# Patient Record
Sex: MALE | Race: WHITE | URBAN - METROPOLITAN AREA
[De-identification: names, ages, dates, MRNs, and addresses within clinical notes are randomized per-mention and may not be internally consistent; named-entity substitution may affect disease eponyms.]

---

## 2018-11-07 ENCOUNTER — APPOINTMENT (RX ONLY)
Dept: URBAN - METROPOLITAN AREA CLINIC 325 | Facility: CLINIC | Age: 68
Setting detail: DERMATOLOGY
End: 2018-11-07

## 2019-06-05 ENCOUNTER — APPOINTMENT (RX ONLY)
Dept: URBAN - METROPOLITAN AREA CLINIC 325 | Facility: CLINIC | Age: 69
Setting detail: DERMATOLOGY
End: 2019-06-05

## 2019-06-05 DIAGNOSIS — D22 MELANOCYTIC NEVI: ICD-10-CM

## 2019-06-05 DIAGNOSIS — L82.1 OTHER SEBORRHEIC KERATOSIS: ICD-10-CM

## 2019-06-05 DIAGNOSIS — L57.0 ACTINIC KERATOSIS: ICD-10-CM

## 2019-06-05 DIAGNOSIS — D69.2 OTHER NONTHROMBOCYTOPENIC PURPURA: ICD-10-CM

## 2019-06-05 DIAGNOSIS — L81.4 OTHER MELANIN HYPERPIGMENTATION: ICD-10-CM

## 2019-06-05 DIAGNOSIS — L57.8 OTHER SKIN CHANGES DUE TO CHRONIC EXPOSURE TO NONIONIZING RADIATION: ICD-10-CM

## 2019-06-05 PROBLEM — Z85.6 PERSONAL HISTORY OF LEUKEMIA: Status: ACTIVE | Noted: 2019-06-05

## 2019-06-05 PROBLEM — J30.1 ALLERGIC RHINITIS DUE TO POLLEN: Status: ACTIVE | Noted: 2019-06-05

## 2019-06-05 PROBLEM — Z85.79 PERSONAL HISTORY OF OTHER MALIGNANT NEOPLASMS OF LYMPHOID, HEMATOPOIETIC AND RELATED TISSUES: Status: ACTIVE | Noted: 2019-06-05

## 2019-06-05 PROBLEM — L55.1 SUNBURN OF SECOND DEGREE: Status: ACTIVE | Noted: 2019-06-05

## 2019-06-05 PROBLEM — N40.0 BENIGN PROSTATIC HYPERPLASIA WITHOUT LOWER URINARY TRACT SYMPTOMS: Status: ACTIVE | Noted: 2019-06-05

## 2019-06-05 PROBLEM — D22.9 MELANOCYTIC NEVI, UNSPECIFIED: Status: ACTIVE | Noted: 2019-06-05

## 2019-06-05 PROCEDURE — 17003 DESTRUCT PREMALG LES 2-14: CPT

## 2019-06-05 PROCEDURE — ? COUNSELING

## 2019-06-05 PROCEDURE — 99202 OFFICE O/P NEW SF 15 MIN: CPT | Mod: 25

## 2019-06-05 PROCEDURE — ? LIQUID NITROGEN

## 2019-06-05 PROCEDURE — 17000 DESTRUCT PREMALG LESION: CPT

## 2019-06-05 ASSESSMENT — LOCATION DETAILED DESCRIPTION DERM
LOCATION DETAILED: LEFT ANTERIOR PROXIMAL UPPER ARM
LOCATION DETAILED: RIGHT ANTERIOR SHOULDER
LOCATION DETAILED: LEFT SUPERIOR FOREHEAD
LOCATION DETAILED: LEFT SUPERIOR PARIETAL SCALP
LOCATION DETAILED: RIGHT SUPERIOR MEDIAL FOREHEAD
LOCATION DETAILED: RIGHT SUPERIOR FOREHEAD

## 2019-06-05 ASSESSMENT — LOCATION SIMPLE DESCRIPTION DERM
LOCATION SIMPLE: LEFT FOREHEAD
LOCATION SIMPLE: RIGHT SHOULDER
LOCATION SIMPLE: LEFT UPPER ARM
LOCATION SIMPLE: SCALP
LOCATION SIMPLE: RIGHT FOREHEAD

## 2019-06-05 ASSESSMENT — LOCATION ZONE DERM
LOCATION ZONE: ARM
LOCATION ZONE: SCALP
LOCATION ZONE: FACE

## 2020-01-29 ENCOUNTER — APPOINTMENT (RX ONLY)
Dept: URBAN - METROPOLITAN AREA CLINIC 325 | Facility: CLINIC | Age: 70
Setting detail: DERMATOLOGY
End: 2020-01-29

## 2020-01-29 DIAGNOSIS — L57.0 ACTINIC KERATOSIS: ICD-10-CM

## 2020-01-29 DIAGNOSIS — L57.8 OTHER SKIN CHANGES DUE TO CHRONIC EXPOSURE TO NONIONIZING RADIATION: ICD-10-CM

## 2020-01-29 DIAGNOSIS — D69.2 OTHER NONTHROMBOCYTOPENIC PURPURA: ICD-10-CM

## 2020-01-29 DIAGNOSIS — D22 MELANOCYTIC NEVI: ICD-10-CM

## 2020-01-29 DIAGNOSIS — L82.1 OTHER SEBORRHEIC KERATOSIS: ICD-10-CM

## 2020-01-29 DIAGNOSIS — L81.4 OTHER MELANIN HYPERPIGMENTATION: ICD-10-CM

## 2020-01-29 DIAGNOSIS — D485 NEOPLASM OF UNCERTAIN BEHAVIOR OF SKIN: ICD-10-CM

## 2020-01-29 PROBLEM — D22.9 MELANOCYTIC NEVI, UNSPECIFIED: Status: ACTIVE | Noted: 2020-01-29

## 2020-01-29 PROBLEM — D48.5 NEOPLASM OF UNCERTAIN BEHAVIOR OF SKIN: Status: ACTIVE | Noted: 2020-01-29

## 2020-01-29 PROCEDURE — ? COUNSELING

## 2020-01-29 PROCEDURE — ? FULL BODY SKIN EXAM

## 2020-01-29 PROCEDURE — 11103 TANGNTL BX SKIN EA SEP/ADDL: CPT

## 2020-01-29 PROCEDURE — 99213 OFFICE O/P EST LOW 20 MIN: CPT | Mod: 25

## 2020-01-29 PROCEDURE — 11102 TANGNTL BX SKIN SINGLE LES: CPT

## 2020-01-29 PROCEDURE — ? BIOPSY BY SHAVE METHOD

## 2020-01-29 PROCEDURE — ? DEFER

## 2020-01-29 ASSESSMENT — LOCATION SIMPLE DESCRIPTION DERM
LOCATION SIMPLE: RIGHT POPLITEAL SKIN
LOCATION SIMPLE: LEFT FOREARM
LOCATION SIMPLE: SCALP
LOCATION SIMPLE: LEFT ZYGOMA
LOCATION SIMPLE: LEFT FOREHEAD
LOCATION SIMPLE: RIGHT TEMPLE
LOCATION SIMPLE: RIGHT HAND
LOCATION SIMPLE: SUPERIOR FOREHEAD
LOCATION SIMPLE: LEFT HAND
LOCATION SIMPLE: LEFT PRETIBIAL REGION
LOCATION SIMPLE: RIGHT FOREARM
LOCATION SIMPLE: CHEST
LOCATION SIMPLE: LEFT ANKLE

## 2020-01-29 ASSESSMENT — LOCATION ZONE DERM
LOCATION ZONE: TRUNK
LOCATION ZONE: FACE
LOCATION ZONE: ARM
LOCATION ZONE: SCALP
LOCATION ZONE: LEG
LOCATION ZONE: HAND

## 2020-01-29 ASSESSMENT — LOCATION DETAILED DESCRIPTION DERM
LOCATION DETAILED: RIGHT POPLITEAL SKIN
LOCATION DETAILED: RIGHT RADIAL DORSAL HAND
LOCATION DETAILED: RIGHT CENTRAL TEMPLE
LOCATION DETAILED: LEFT RADIAL DORSAL HAND
LOCATION DETAILED: SUPERIOR MID FOREHEAD
LOCATION DETAILED: LEFT CENTRAL PARIETAL SCALP
LOCATION DETAILED: LEFT DISTAL DORSAL FOREARM
LOCATION DETAILED: LEFT MEDIAL ZYGOMA
LOCATION DETAILED: LEFT POSTERIOR ANKLE
LOCATION DETAILED: LEFT LATERAL SUPERIOR CHEST
LOCATION DETAILED: LEFT SUPERIOR MEDIAL FOREHEAD
LOCATION DETAILED: RIGHT PROXIMAL DORSAL FOREARM
LOCATION DETAILED: LEFT PROXIMAL PRETIBIAL REGION

## 2020-01-29 NOTE — PROCEDURE: MIPS QUALITY
Quality 138: Melanoma: Coordination Of Care: The patient does not have a PCP or referring physician.
Quality 47: Advance Care Plan: Advance care planning not documented, reason not otherwise specified.
Quality 337: Tuberculosis Prevention For Psoriasis And Psoriatic Arthritis Patients On A Biological Immune Response Modifier: No documentation of negative or managed positive TB screen
Quality 431: Preventive Care And Screening: Unhealthy Alcohol Use - Screening: Unhealthy alcohol use screening not performed, reason not otherwise specified
Quality 402: Tobacco Use And Help With Quitting Among Adolescents: Patient screened for tobacco and is an ex-smoker
Quality 130: Documentation Of Current Medications In The Medical Record: Current Medications Documented
Detail Level: Detailed
Quality 410: Psoriasis Clinical Response To Oral Systemic Or Biologic Mediations: Psoriasis Assessment Tool NOT Documented
Quality 226: Preventive Care And Screening: Tobacco Use: Screening And Cessation Intervention: Patient screened for tobacco use and is an ex/non-smoker
Quality 137: Melanoma: Continuity Of Care - Recall System: Recall system not utilized, reason not otherwise specified

## 2020-01-29 NOTE — PROCEDURE: BIOPSY BY SHAVE METHOD
Type Of Destruction Used: Curettage
Consent: Written consent was obtained and risks were reviewed including but not limited to scarring, infection, bleeding, scabbing, incomplete removal, nerve damage and allergy to anesthesia.
Electrodesiccation And Curettage Text: The wound bed was treated with electrodesiccation and curettage after the biopsy was performed.
Hide Biopsy Depth?: No
Biopsy Type: H and E
Electrodesiccation Text: The wound bed was treated with electrodesiccation after the biopsy was performed.
Notification Instructions: Patient advised the office will call only if further action is needed
Silver Nitrate Text: The wound bed was treated with silver nitrate after the biopsy was performed.
Detail Level: Detailed
Biopsy Method: double edge Personna blade
Dressing: bandage
Was A Bandage Applied: Yes
Anesthesia Volume In Cc (Will Not Render If 0): 0.5
Size Of Lesion In Cm: 1
Billing Type: Third-Party Bill
Lab: -[id number]
Curettage Text: The wound bed was treated with curettage after the biopsy was performed.
Hemostasis: Electrocautery
Wound Care: Aquaphor
Lab Facility: 3
X Size Of Lesion In Cm: 0
Anesthesia Type: 1% lidocaine with 1:100,000 epinephrine and 408mcg clindamycin/ml and a 1:10 solution of 8.4% sodium bicarbonate
Post-Care Instructions: I reviewed with the patient in detail post-care instructions. Patient is to keep the biopsy site dry overnight, and then apply petrolatum or aquaphor  daily until healed. May leave area opened without bandage unless rubbing against clothing. Patient to avoid Neosporin, Polyrporin, Bacitracin and Hydrogen Peroxide.
Cryotherapy Text: The wound bed was treated with cryotherapy after the biopsy was performed.
Depth Of Biopsy: dermis
Size Of Lesion In Cm: 1.5

## 2020-02-12 ENCOUNTER — APPOINTMENT (RX ONLY)
Dept: URBAN - METROPOLITAN AREA CLINIC 325 | Facility: CLINIC | Age: 70
Setting detail: DERMATOLOGY
End: 2020-02-12

## 2020-02-12 DIAGNOSIS — L57.0 ACTINIC KERATOSIS: ICD-10-CM

## 2020-02-12 DIAGNOSIS — D69.2 OTHER NONTHROMBOCYTOPENIC PURPURA: ICD-10-CM

## 2020-02-12 PROBLEM — C44.722 SQUAMOUS CELL CARCINOMA OF SKIN OF RIGHT LOWER LIMB, INCLUDING HIP: Status: ACTIVE | Noted: 2020-02-12

## 2020-02-12 PROBLEM — C44.719 BASAL CELL CARCINOMA OF SKIN OF LEFT LOWER LIMB, INCLUDING HIP: Status: ACTIVE | Noted: 2020-02-12

## 2020-02-12 PROCEDURE — 17000 DESTRUCT PREMALG LESION: CPT

## 2020-02-12 PROCEDURE — ? CONSULTATION FOR MOHS SURGERY

## 2020-02-12 PROCEDURE — ? LIQUID NITROGEN

## 2020-02-12 PROCEDURE — ? COUNSELING

## 2020-02-12 PROCEDURE — ? ADDITIONAL NOTES

## 2020-02-12 PROCEDURE — 99213 OFFICE O/P EST LOW 20 MIN: CPT | Mod: 25

## 2020-02-12 PROCEDURE — 17003 DESTRUCT PREMALG LES 2-14: CPT

## 2020-02-12 ASSESSMENT — LOCATION DETAILED DESCRIPTION DERM
LOCATION DETAILED: LEFT LATERAL CANTHUS
LOCATION DETAILED: LEFT CENTRAL PARIETAL SCALP
LOCATION DETAILED: RIGHT DISTAL CALF
LOCATION DETAILED: LEFT LATERAL INFERIOR CHEST
LOCATION DETAILED: LEFT POSTERIOR ANKLE
LOCATION DETAILED: LEFT SUPERIOR MEDIAL FOREHEAD
LOCATION DETAILED: LEFT DISTAL PRETIBIAL REGION
LOCATION DETAILED: LEFT MEDIAL SUPERIOR CHEST
LOCATION DETAILED: RIGHT DISTAL DORSAL FOREARM

## 2020-02-12 ASSESSMENT — LOCATION SIMPLE DESCRIPTION DERM
LOCATION SIMPLE: LEFT LATERAL CANTHUS
LOCATION SIMPLE: LEFT FOREHEAD
LOCATION SIMPLE: SCALP
LOCATION SIMPLE: LEFT PRETIBIAL REGION
LOCATION SIMPLE: RIGHT FOREARM
LOCATION SIMPLE: RIGHT CALF
LOCATION SIMPLE: LEFT ANKLE
LOCATION SIMPLE: CHEST

## 2020-02-12 ASSESSMENT — LOCATION ZONE DERM
LOCATION ZONE: FACE
LOCATION ZONE: ARM
LOCATION ZONE: TRUNK
LOCATION ZONE: SCALP
LOCATION ZONE: EYELID
LOCATION ZONE: LEG

## 2020-02-12 NOTE — PROCEDURE: MIPS QUALITY
Quality 47: Advance Care Plan: Advance care planning not documented, reason not otherwise specified.
Quality 138: Melanoma: Coordination Of Care: The patient does not have a PCP or referring physician.
Quality 431: Preventive Care And Screening: Unhealthy Alcohol Use - Screening: Unhealthy alcohol use screening not performed, reason not otherwise specified
Quality 337: Tuberculosis Prevention For Psoriasis And Psoriatic Arthritis Patients On A Biological Immune Response Modifier: No documentation of negative or managed positive TB screen
Quality 402: Tobacco Use And Help With Quitting Among Adolescents: Patient screened for tobacco and is an ex-smoker
Quality 130: Documentation Of Current Medications In The Medical Record: Current Medications Documented
Quality 137: Melanoma: Continuity Of Care - Recall System: Recall system not utilized, reason not otherwise specified
Quality 410: Psoriasis Clinical Response To Oral Systemic Or Biologic Mediations: Psoriasis Assessment Tool NOT Documented
Detail Level: Detailed
Quality 226: Preventive Care And Screening: Tobacco Use: Screening And Cessation Intervention: Patient screened for tobacco use and is an ex/non-smoker

## 2020-02-12 NOTE — PROCEDURE: ADDITIONAL NOTES
Detail Level: Simple
Additional Notes: 2ND [facility].
Additional Notes: 1ST MOHS SITE

## 2020-03-11 ENCOUNTER — APPOINTMENT (RX ONLY)
Dept: URBAN - METROPOLITAN AREA CLINIC 325 | Facility: CLINIC | Age: 70
Setting detail: DERMATOLOGY
End: 2020-03-11

## 2020-03-11 PROCEDURE — ? MOHS SURGERY

## 2020-04-27 ENCOUNTER — APPOINTMENT (RX ONLY)
Dept: URBAN - METROPOLITAN AREA CLINIC 325 | Facility: CLINIC | Age: 70
Setting detail: DERMATOLOGY
End: 2020-04-27

## 2020-04-27 PROBLEM — C44.722 SQUAMOUS CELL CARCINOMA OF SKIN OF RIGHT LOWER LIMB, INCLUDING HIP: Status: ACTIVE | Noted: 2020-04-27

## 2020-04-27 PROCEDURE — ? MOHS SURGERY

## 2020-04-27 PROCEDURE — 13121 CMPLX RPR S/A/L 2.6-7.5 CM: CPT

## 2020-04-27 PROCEDURE — 17313 MOHS 1 STAGE T/A/L: CPT

## 2020-05-11 ENCOUNTER — APPOINTMENT (RX ONLY)
Dept: URBAN - METROPOLITAN AREA CLINIC 325 | Facility: CLINIC | Age: 70
Setting detail: DERMATOLOGY
End: 2020-05-11

## 2020-05-11 DIAGNOSIS — Z48.02 ENCOUNTER FOR REMOVAL OF SUTURES: ICD-10-CM

## 2020-05-11 DIAGNOSIS — L82.1 OTHER SEBORRHEIC KERATOSIS: ICD-10-CM

## 2020-05-11 DIAGNOSIS — L57.0 ACTINIC KERATOSIS: ICD-10-CM

## 2020-05-11 DIAGNOSIS — D485 NEOPLASM OF UNCERTAIN BEHAVIOR OF SKIN: ICD-10-CM

## 2020-05-11 PROBLEM — D48.5 NEOPLASM OF UNCERTAIN BEHAVIOR OF SKIN: Status: ACTIVE | Noted: 2020-05-11

## 2020-05-11 PROCEDURE — ? SUTURE REMOVAL (NO GLOBAL PERIOD)

## 2020-05-11 PROCEDURE — ? COUNSELING

## 2020-05-11 PROCEDURE — ? FULL BODY SKIN EXAM - DECLINED

## 2020-05-11 PROCEDURE — 99213 OFFICE O/P EST LOW 20 MIN: CPT

## 2020-05-11 PROCEDURE — ? DEFER

## 2020-05-11 ASSESSMENT — LOCATION ZONE DERM
LOCATION ZONE: SCALP
LOCATION ZONE: LEG
LOCATION ZONE: FACE
LOCATION ZONE: EAR

## 2020-05-11 ASSESSMENT — LOCATION DETAILED DESCRIPTION DERM
LOCATION DETAILED: RIGHT POPLITEAL SKIN
LOCATION DETAILED: RIGHT CENTRAL MALAR CHEEK
LOCATION DETAILED: LEFT SUPERIOR CRUS OF ANTIHELIX
LOCATION DETAILED: RIGHT SUPERIOR PARIETAL SCALP
LOCATION DETAILED: LEFT DISTAL POSTERIOR THIGH
LOCATION DETAILED: LEFT CENTRAL PARIETAL SCALP

## 2020-05-11 ASSESSMENT — LOCATION SIMPLE DESCRIPTION DERM
LOCATION SIMPLE: RIGHT POPLITEAL SKIN
LOCATION SIMPLE: LEFT EAR
LOCATION SIMPLE: LEFT POSTERIOR THIGH
LOCATION SIMPLE: RIGHT CHEEK
LOCATION SIMPLE: SCALP

## 2020-05-11 NOTE — PROCEDURE: MIPS QUALITY
Quality 47: Advance Care Plan: Advance Care Planning discussed and documented; advance care plan or surrogate decision maker documented in the medical record.
Quality 138: Melanoma: Coordination Of Care: Treatment plan not communicated, reason not otherwise specified.
Quality 226: Preventive Care And Screening: Tobacco Use: Screening And Cessation Intervention: Tobacco Screening not Performed for Medical Reasons
Quality 337: Tuberculosis Prevention For Psoriasis And Psoriatic Arthritis Patients On A Biological Immune Response Modifier: No documentation of negative or managed positive TB screen
Quality 130: Documentation Of Current Medications In The Medical Record: Current Medications Documented
Detail Level: Detailed
Quality 137: Melanoma: Continuity Of Care - Recall System: Recall system not utilized, reason not otherwise specified
Quality 402: Tobacco Use And Help With Quitting Among Adolescents: Tobacco Screening OR Tobacco Cessation Intervention not Performed Reason Not Otherwise Specified
Quality 410: Psoriasis Clinical Response To Oral Systemic Or Biologic Mediations: Psoriasis Assessment Tool NOT Documented
Quality 431: Preventive Care And Screening: Unhealthy Alcohol Use - Screening: Unhealthy alcohol use screening not performed, reason not otherwise specified

## 2020-05-11 NOTE — PROCEDURE: DEFER
Detail Level: Detailed
Introduction Text (Please End With A Colon): The following procedure was deferred:
Procedure To Be Performed At Next Visit: Cryotherapy
Instructions (Optional): Scalp and left ear
Procedure To Be Performed At Next Visit: Biopsy by shave method
Instructions (Optional): Scalp 0.6cm, rule out BCC VS SCC

## 2020-05-11 NOTE — PROCEDURE: SUTURE REMOVAL (NO GLOBAL PERIOD)
Detail Level: Detailed
Abdomen soft, nontender, nondistended, bowel sounds present in all 4 quadrants.

## 2020-06-02 ENCOUNTER — APPOINTMENT (RX ONLY)
Dept: URBAN - METROPOLITAN AREA CLINIC 325 | Facility: CLINIC | Age: 70
Setting detail: DERMATOLOGY
End: 2020-06-02

## 2020-06-02 PROBLEM — C44.719 BASAL CELL CARCINOMA OF SKIN OF LEFT LOWER LIMB, INCLUDING HIP: Status: ACTIVE | Noted: 2020-06-02

## 2020-06-02 PROCEDURE — ? FULL BODY SKIN EXAM - DECLINED

## 2020-06-02 PROCEDURE — 13121 CMPLX RPR S/A/L 2.6-7.5 CM: CPT

## 2020-06-02 PROCEDURE — 17313 MOHS 1 STAGE T/A/L: CPT

## 2020-06-02 PROCEDURE — ? MOHS SURGERY

## 2020-06-02 NOTE — PROCEDURE: MOHS SURGERY
How Severe Are Your Spot(S)?: mild Hpi Title: Evaluation of Skin Lesions Family Member: Mother Location Indication Override (Is Already Calculated Based On Selected Body Location): Area L

## 2020-06-02 NOTE — PROCEDURE: MOHS SURGERY
waiting Closure 3 Information: This tab is for additional flaps and grafts above and beyond our usual structured repairs.  Please note if you enter information here it will not currently bill and you will need to add the billing information manually.

## 2020-06-16 ENCOUNTER — APPOINTMENT (RX ONLY)
Dept: URBAN - METROPOLITAN AREA CLINIC 325 | Facility: CLINIC | Age: 70
Setting detail: DERMATOLOGY
End: 2020-06-16

## 2020-06-16 DIAGNOSIS — Z48.02 ENCOUNTER FOR REMOVAL OF SUTURES: ICD-10-CM

## 2020-06-16 DIAGNOSIS — L57.0 ACTINIC KERATOSIS: ICD-10-CM

## 2020-06-16 DIAGNOSIS — D485 NEOPLASM OF UNCERTAIN BEHAVIOR OF SKIN: ICD-10-CM

## 2020-06-16 PROBLEM — D48.5 NEOPLASM OF UNCERTAIN BEHAVIOR OF SKIN: Status: ACTIVE | Noted: 2020-06-16

## 2020-06-16 PROCEDURE — ? LIQUID NITROGEN

## 2020-06-16 PROCEDURE — 17003 DESTRUCT PREMALG LES 2-14: CPT

## 2020-06-16 PROCEDURE — ? SUTURE REMOVAL (NO GLOBAL PERIOD)

## 2020-06-16 PROCEDURE — ? FULL BODY SKIN EXAM - DECLINED

## 2020-06-16 PROCEDURE — ? BIOPSY BY SHAVE METHOD

## 2020-06-16 PROCEDURE — 11102 TANGNTL BX SKIN SINGLE LES: CPT

## 2020-06-16 PROCEDURE — ? ADDITIONAL NOTES

## 2020-06-16 PROCEDURE — 17000 DESTRUCT PREMALG LESION: CPT | Mod: 59

## 2020-06-16 ASSESSMENT — LOCATION DETAILED DESCRIPTION DERM
LOCATION DETAILED: LEFT DISTAL PRETIBIAL REGION
LOCATION DETAILED: LEFT ANTIHELIX
LOCATION DETAILED: LEFT INFERIOR TEMPLE
LOCATION DETAILED: LEFT SUPERIOR PARIETAL SCALP

## 2020-06-16 ASSESSMENT — LOCATION SIMPLE DESCRIPTION DERM
LOCATION SIMPLE: LEFT EAR
LOCATION SIMPLE: SCALP
LOCATION SIMPLE: LEFT TEMPLE
LOCATION SIMPLE: LEFT PRETIBIAL REGION

## 2020-06-16 ASSESSMENT — LOCATION ZONE DERM
LOCATION ZONE: LEG
LOCATION ZONE: SCALP
LOCATION ZONE: EAR
LOCATION ZONE: FACE

## 2020-06-16 ASSESSMENT — PAIN INTENSITY VAS: HOW INTENSE IS YOUR PAIN 0 BEING NO PAIN, 10 BEING THE MOST SEVERE PAIN POSSIBLE?: NO PAIN

## 2020-06-16 NOTE — PROCEDURE: BIOPSY BY SHAVE METHOD
Detail Level: Detailed
Depth Of Biopsy: dermis
Was A Bandage Applied: Yes
Size Of Lesion In Cm: 0.6
X Size Of Lesion In Cm: 0
Biopsy Type: H and E
Biopsy Method: double edge Personna blade
Anesthesia Type: 1% lidocaine with 1:100,000 epinephrine and 408mcg clindamycin/ml and a 1:10 solution of 8.4% sodium bicarbonate
Anesthesia Volume In Cc (Will Not Render If 0): 0.5
Hemostasis: Electrocautery
Wound Care: Aquaphor
Dressing: bandage
Destruction After The Procedure: No
Type Of Destruction Used: Curettage
Curettage Text: The wound bed was treated with curettage after the biopsy was performed.
Cryotherapy Text: The wound bed was treated with cryotherapy after the biopsy was performed.
Electrodesiccation Text: The wound bed was treated with electrodesiccation after the biopsy was performed.
Electrodesiccation And Curettage Text: The wound bed was treated with electrodesiccation and curettage after the biopsy was performed.
Silver Nitrate Text: The wound bed was treated with silver nitrate after the biopsy was performed.
Lab: -60
Lab Facility: [id number]
Consent: Written consent was obtained and risks were reviewed including but not limited to scarring, infection, bleeding, scabbing, incomplete removal, nerve damage and allergy to anesthesia.
Post-Care Instructions: I reviewed with the patient in detail post-care instructions. Patient is to keep the biopsy site dry overnight, and then apply petrolatum or aquaphor  daily until healed. May leave area opened without bandage unless rubbing against clothing. Patient to avoid Neosporin, Polyrporin, Bacitracin and Hydrogen Peroxide.
Notification Instructions: Patient advised the office will call only if further action is needed
Billing Type: Third-Party Bill
Information: Selecting Yes will display possible errors in your note based on the variables you have selected. This validation is only offered as a suggestion for you. PLEASE NOTE THAT THE VALIDATION TEXT WILL BE REMOVED WHEN YOU FINALIZE YOUR NOTE. IF YOU WANT TO FAX A PRELIMINARY NOTE YOU WILL NEED TO TOGGLE THIS TO 'NO' IF YOU DO NOT WANT IT IN YOUR FAXED NOTE.

## 2020-06-16 NOTE — PROCEDURE: MIPS QUALITY
Quality 205 Hiv/Aids: Sexually Transmitted Disease Screening For Chlamydia, Gonorrhea, And Syphilis: Patient refused screening.
Quality 337: Tuberculosis Prevention For Psoriasis And Psoriatic Arthritis Patients On A Biological Immune Response Modifier: No documentation of negative or managed positive TB screen
Quality 226: Preventive Care And Screening: Tobacco Use: Screening And Cessation Intervention: Tobacco Screening not Performed for Unknown Reasons
Quality 127: Diabetic Foot And Ankle Care, Ulcer Prevention - Evaluation Of Footwear: Footwear Evaluation not Performed
Quality 143: Oncology: Medical And Radiation- Pain Intensity Quantified: Pain severity not assessed.
Detail Level: Detailed
Quality 130: Documentation Of Current Medications In The Medical Record: Current Medications Documented
Quality 265: Biopsy Follow-Up: Biopsy Results not Reviewed, not Communicated to the Patient and the Patient's Primary Care/Referring Physician, Communication not Tracked in a Log, and/or Tracking Process not Documented in the Patient's Medical Record.
Quality 431: Preventive Care And Screening: Unhealthy Alcohol Use - Screening: Unhealthy alcohol use screening not performed, reason not otherwise specified
Quality 138: Melanoma: Coordination Of Care: Treatment plan not communicated, reason not otherwise specified.
Quality 47: Advance Care Plan: Advance care planning not documented, reason not otherwise specified.
Quality 93: Acute Otitis Externa (Aoe): Systemic Antimicrobial Therapy - Avoidance Or Inappropriate Use: Physician did NOT prescribing a systemic antibiotic for AOE

## 2020-07-08 ENCOUNTER — APPOINTMENT (RX ONLY)
Dept: URBAN - METROPOLITAN AREA CLINIC 325 | Facility: CLINIC | Age: 70
Setting detail: DERMATOLOGY
End: 2020-07-08

## 2020-07-08 PROBLEM — C44.42 SQUAMOUS CELL CARCINOMA OF SKIN OF SCALP AND NECK: Status: ACTIVE | Noted: 2020-07-08

## 2020-07-08 PROCEDURE — ? MOHS SURGERY

## 2020-07-08 PROCEDURE — 13121 CMPLX RPR S/A/L 2.6-7.5 CM: CPT

## 2020-07-08 PROCEDURE — 17311 MOHS 1 STAGE H/N/HF/G: CPT

## 2020-07-08 NOTE — PROCEDURE: MOHS SURGERY
Attending Only Mart-1 - Positive Histology Text: MART-1 staining demonstrates areas of higher density and clustering of melanocytes with Pagetoid spread upwards within the epidermis. The surgical margins are positive for tumor cells.

## 2020-07-22 ENCOUNTER — APPOINTMENT (RX ONLY)
Dept: URBAN - METROPOLITAN AREA CLINIC 325 | Facility: CLINIC | Age: 70
Setting detail: DERMATOLOGY
End: 2020-07-22

## 2020-07-22 DIAGNOSIS — Z48.02 ENCOUNTER FOR REMOVAL OF SUTURES: ICD-10-CM

## 2020-07-22 DIAGNOSIS — L72.0 EPIDERMAL CYST: ICD-10-CM

## 2020-07-22 DIAGNOSIS — L82.1 OTHER SEBORRHEIC KERATOSIS: ICD-10-CM

## 2020-07-22 DIAGNOSIS — D69.2 OTHER NONTHROMBOCYTOPENIC PURPURA: ICD-10-CM

## 2020-07-22 PROCEDURE — ? COUNSELING

## 2020-07-22 PROCEDURE — 99213 OFFICE O/P EST LOW 20 MIN: CPT

## 2020-07-22 PROCEDURE — ? SUTURE REMOVAL (NO GLOBAL PERIOD)

## 2020-07-22 ASSESSMENT — LOCATION SIMPLE DESCRIPTION DERM
LOCATION SIMPLE: LEFT POSTERIOR THIGH
LOCATION SIMPLE: CHEST
LOCATION SIMPLE: SCALP
LOCATION SIMPLE: LEFT FOREARM
LOCATION SIMPLE: RIGHT FOREARM
LOCATION SIMPLE: LEFT LOWER BACK

## 2020-07-22 ASSESSMENT — LOCATION ZONE DERM
LOCATION ZONE: ARM
LOCATION ZONE: LEG
LOCATION ZONE: TRUNK
LOCATION ZONE: SCALP

## 2020-07-22 ASSESSMENT — LOCATION DETAILED DESCRIPTION DERM
LOCATION DETAILED: LEFT PROXIMAL POSTERIOR THIGH
LOCATION DETAILED: RIGHT PROXIMAL DORSAL FOREARM
LOCATION DETAILED: LEFT SUPERIOR LATERAL MIDBACK
LOCATION DETAILED: RIGHT MEDIAL INFERIOR CHEST
LOCATION DETAILED: LEFT DISTAL POSTERIOR THIGH
LOCATION DETAILED: LEFT PROXIMAL DORSAL FOREARM
LOCATION DETAILED: LEFT SUPERIOR PARIETAL SCALP

## 2020-10-14 ENCOUNTER — APPOINTMENT (RX ONLY)
Dept: URBAN - METROPOLITAN AREA CLINIC 325 | Facility: CLINIC | Age: 70
Setting detail: DERMATOLOGY
End: 2020-10-14

## 2020-10-14 DIAGNOSIS — L11.1 TRANSIENT ACANTHOLYTIC DERMATOSIS [GROVER]: ICD-10-CM

## 2020-10-14 DIAGNOSIS — L98429 CHRONIC ULCER OF OTHER SPECIFIED SITES: ICD-10-CM

## 2020-10-14 DIAGNOSIS — D69.2 OTHER NONTHROMBOCYTOPENIC PURPURA: ICD-10-CM

## 2020-10-14 DIAGNOSIS — D22 MELANOCYTIC NEVI: ICD-10-CM

## 2020-10-14 DIAGNOSIS — L81.4 OTHER MELANIN HYPERPIGMENTATION: ICD-10-CM

## 2020-10-14 DIAGNOSIS — L98419 CHRONIC ULCER OF OTHER SPECIFIED SITES: ICD-10-CM

## 2020-10-14 DIAGNOSIS — Z85.828 PERSONAL HISTORY OF OTHER MALIGNANT NEOPLASM OF SKIN: ICD-10-CM

## 2020-10-14 DIAGNOSIS — L82.1 OTHER SEBORRHEIC KERATOSIS: ICD-10-CM

## 2020-10-14 PROBLEM — D22.71 MELANOCYTIC NEVI OF RIGHT LOWER LIMB, INCLUDING HIP: Status: ACTIVE | Noted: 2020-10-14

## 2020-10-14 PROBLEM — L98.499 NON-PRESSURE CHRONIC ULCER OF SKIN OF OTHER SITES WITH UNSPECIFIED SEVERITY: Status: ACTIVE | Noted: 2020-10-14

## 2020-10-14 PROCEDURE — ? FULL BODY SKIN EXAM

## 2020-10-14 PROCEDURE — ? ADDITIONAL NOTES

## 2020-10-14 PROCEDURE — ? COUNSELING

## 2020-10-14 PROCEDURE — 99213 OFFICE O/P EST LOW 20 MIN: CPT

## 2020-10-14 ASSESSMENT — LOCATION ZONE DERM
LOCATION ZONE: TRUNK
LOCATION ZONE: FEET
LOCATION ZONE: ARM

## 2020-10-14 ASSESSMENT — LOCATION DETAILED DESCRIPTION DERM
LOCATION DETAILED: INFERIOR THORACIC SPINE
LOCATION DETAILED: SUPRAPUBIC SKIN
LOCATION DETAILED: LEFT PROXIMAL DORSAL FOREARM
LOCATION DETAILED: RIGHT INSTEP
LOCATION DETAILED: RIGHT PROXIMAL DORSAL FOREARM

## 2020-10-14 ASSESSMENT — LOCATION SIMPLE DESCRIPTION DERM
LOCATION SIMPLE: UPPER BACK
LOCATION SIMPLE: RIGHT PLANTAR SURFACE
LOCATION SIMPLE: LEFT FOREARM
LOCATION SIMPLE: RIGHT FOREARM
LOCATION SIMPLE: GROIN

## 2020-10-14 NOTE — HPI: RASH
What Type Of Note Output Would You Prefer (Optional)?: Standard Output
How Severe Is Your Rash?: mild
Is This A New Presentation, Or A Follow-Up?: Rash
Additional History: NO ITCHING NO PAIN. C/O REDNESS. PT. STATES IMPROVING. STAYED IN THE HOSPITAL 5 DAYS

## 2020-10-14 NOTE — PROCEDURE: ADDITIONAL NOTES
Detail Level: Simple
Additional Notes: PT. DECLINED TREATMENT
Additional Notes: CONTINUE WITH TOPICAL CALAZONE CREAM.

## 2020-10-14 NOTE — HPI: SKIN LESION
What Type Of Note Output Would You Prefer (Optional)?: Standard Output
How Severe Is Your Skin Lesion?: mild
Has Your Skin Lesion Been Treated?: not been treated
Is This A New Presentation, Or A Follow-Up?: Skin Lesion
Additional History: PT. WAS SENT TO HOSPITAL FOR IV ANTIBIOTICS. HAD PNEUMONIA STARTING IN THE LUNG. PT. STATES AREA IS STILL HEALING. WAS DRAINING.

## 2022-02-15 NOTE — PROCEDURE: MOHS SURGERY
Helical Rim Text: The closure involved the helical rim. Interpolation Flap Text: A decision was made to reconstruct the defect utilizing an interpolation axial flap and a staged reconstruction.  A telfa template was made of the defect.  This telfa template was then used to outline the interpolation flap.  The donor area for the pedicle flap was then injected with anesthesia.  The flap was excised through the skin and subcutaneous tissue down to the layer of the underlying musculature.  The interpolation flap was carefully excised within this deep plane to maintain its blood supply.  The edges of the donor site were undermined.   The donor site was closed in a primary fashion.  The pedicle was then rotated into position and sutured.  Once the tube was sutured into place, adequate blood supply was confirmed with blanching and refill.  The pedicle was then wrapped with xeroform gauze and dressed appropriately with a telfa and gauze bandage to ensure continued blood supply and protect the attached pedicle.

## 2022-11-11 NOTE — PROCEDURE: MOHS SURGERY
code: stroke Home Suture Removal Text: Patient was provided instructions on removing sutures and will remove their sutures at home.  If they have any questions or difficulties they will call the office.

## 2024-04-18 NOTE — PROCEDURE: MOHS SURGERY
Following with dermatology.  Has been managing with steroid creams as needed. Recently approved for Tremfya.  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  He was advised to follow-up with PCP for hep B vaccination  -Recommend repeating hep B vaccination series -can do Engerix-B at 0, 1, and 6 months.  Alternatively can do Hepsilav-B   -Advised patient to try to get recent lab results faxed over from dermatology office   Muscle Hinge Flap Text: The defect edges were debeveled with a #15 scalpel blade.  Given the size, depth and location of the defect and the proximity to free margins a muscle hinge flap was deemed most appropriate.  Using a sterile surgical marker, an appropriate hinge flap was drawn incorporating the defect. The area thus outlined was incised with a #15 scalpel blade.  The skin margins were undermined to an appropriate distance in all directions utilizing iris scissors.

## 2024-10-23 NOTE — PROCEDURE: MOHS SURGERY
97 Complex Repair And Graft Additional Text (Will Appearing After The Standard Complex Repair Text): The complex repair was not sufficient to completely close the primary defect. The remaining additional defect was repaired with the graft mentioned below.

## 2024-12-10 NOTE — PROCEDURE: MOHS SURGERY
denies Ftsg Text: The defect edges were debeveled with a #15 scalpel blade.  Given the location of the defect, shape of the defect and the proximity to free margins a full thickness skin graft was deemed most appropriate.  Using a sterile surgical marker, the primary defect shape was transferred to the donor site. The area thus outlined was incised deep to adipose tissue with a #15 scalpel blade.  The harvested graft was then trimmed of adipose tissue until only dermis and epidermis was left.  The skin margins of the secondary defect were undermined to an appropriate distance in all directions utilizing iris scissors.  The secondary defect was closed with interrupted buried subcutaneous sutures.  The skin edges were then re-apposed with running  sutures.  The skin graft was then placed in the primary defect and oriented appropriately.

## 2025-07-14 NOTE — PROCEDURE: MOHS SURGERY
Patient doing well today; good po; no c/o pain or nausea; rheumatology consulted, labs ordered, waiting for her to stool; started miralax; no other issues at this time; notify team of any new changes.    Staging Info: By selecting yes to the question above you will include information on AJCC 8 tumor staging in your Mohs note. Information on tumor staging will be automatically added for SCCs on the head and neck. AJCC 8 includes tumor size, tumor depth, perineural involvement and bone invasion.